# Patient Record
Sex: FEMALE
[De-identification: names, ages, dates, MRNs, and addresses within clinical notes are randomized per-mention and may not be internally consistent; named-entity substitution may affect disease eponyms.]

---

## 2018-02-01 ENCOUNTER — HOSPITAL ENCOUNTER (EMERGENCY)
Dept: HOSPITAL 46 - ED | Age: 23
Discharge: HOME | End: 2018-02-01
Payer: COMMERCIAL

## 2018-02-01 VITALS — HEIGHT: 65 IN | WEIGHT: 150 LBS | BODY MASS INDEX: 24.99 KG/M2

## 2018-02-01 DIAGNOSIS — F32.9: ICD-10-CM

## 2018-02-01 DIAGNOSIS — F43.10: ICD-10-CM
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
## 2018-02-01 DIAGNOSIS — J45.909: ICD-10-CM

## 2018-02-01 DIAGNOSIS — Z79.899: ICD-10-CM

## 2018-02-01 DIAGNOSIS — F31.9: ICD-10-CM

## 2018-02-01 DIAGNOSIS — S20.212A: Primary | ICD-10-CM

## 2018-02-01 DIAGNOSIS — F41.9: ICD-10-CM

## 2018-02-01 NOTE — XMS
[facility]
  Created on: 2017
 
 Jenise Song
 External Reference #: [id number]
 : [birth year]
 Sex: Female
 
 Demographics
 
 
+-----------------------+---------------------------+
| Address               | [address]             |
|                       | [address]                    |
|                       | [place]  [address] |
+-----------------------+---------------------------+
| Preferred Language    | Unknown                   |
+-----------------------+---------------------------+
| Marital Status        | Unknown                   |
+-----------------------+---------------------------+
| Samaritan Affiliation | Unknown                   |
+-----------------------+---------------------------+
| Race                  | Unknown                   |
+-----------------------+---------------------------+
| Ethnic Group          | Unknown                   |
+-----------------------+---------------------------+
 
 
 Author
 
 
+--------------+----------------------+
| Author       | [facility]    |
+--------------+----------------------+
| Organization | [facility]    |
+--------------+----------------------+
| Address      | [address] |
|              | [place]  [address] |
+--------------+----------------------+
| Phone        | [phone]        |
+--------------+----------------------+
 
 
 
 Care Team Providers
 
 
+-----------------------+-------------+-------------+
| Care Team Member Name | Role        | Phone       |
+-----------------------+-------------+-------------+
 Unavailable | Unavailable |
+-----------------------+-------------+-------------+
 
 
 
 PROBLEMS
 
 
 
+---------+------------+----------+-----------+--------+------------+-----------+
| Type    | Condition  | ICD9-CM  | [id number]  | Onset  | Condition  | SNOMED    |
|         |            | Code     | Code      | Dates  | Status     | Code      |
+---------+------------+----------+-----------+--------+------------+-----------+
| Problem | Bipolar    | F31.9    |           |        | Active     | [id number]  |
|         | disorder   |          |           |        |            |           |
+---------+------------+----------+-----------+--------+------------+-----------+
| Problem | Anxiety    | F41.8    |           |        | Active     | [id number] |
|         | with       |          |           |        |            |           |
|         | depression |          |           |        |            |           |
+---------+------------+----------+-----------+--------+------------+-----------+
| Problem | Left lower | R10.32   |           |        | Active     | [id number] |
|         |  quadrant  |          |           |        |            |           |
|         | abdominal  |          |           |        |            |           |
|         | pain of    |          |           |        |            |           |
|         | unknown    |          |           |        |            |           |
|         | etiology   |          |           |        |            |           |
+---------+------------+----------+-----------+--------+------------+-----------+
| Problem | PTSD       |          | F43.10    |        | Active     | [id number]  |
|         | (post-trau |          |           |        |            |           |
|         | matic      |          |           |        |            |           |
|         | stress     |          |           |        |            |           |
|         | disorder)  |          |           |        |            |           |
+---------+------------+----------+-----------+--------+------------+-----------+
| Problem | Epigastric |          | R10.13    |        | Active     | [id number]  |
|         |  pain      |          |           |        |            |           |
+---------+------------+----------+-----------+--------+------------+-----------+
 
 
 
 ALLERGIES
 Unknown Allergies
 
 SOCIAL HISTORY
 No smoking Hx information available
 
 PLAN OF CARE
 
 
 VITAL SIGNS
 
 
 MEDICATIONS
 Unknown Medications
 
 RESULTS
 No Results
 
 PROCEDURES
 No Known procedures
 
 IMMUNIZATIONS
 No Known Immunizations

## 2018-02-01 NOTE — XMS
[facility]
  Created on: 2017
 
 Jenise Song
 External Reference #: [id number]
 : [birth year]
 Sex: Female
 
 Demographics
 
 
+-----------------------+---------------------------+
| Address               | [address]             |
|                       | [address]                    |
|                       | [place]  [address] |
+-----------------------+---------------------------+
| Preferred Language    | Unknown                   |
+-----------------------+---------------------------+
| Marital Status        | Unknown                   |
+-----------------------+---------------------------+
| Judaism Affiliation | Unknown                   |
+-----------------------+---------------------------+
| Race                  | Unknown                   |
+-----------------------+---------------------------+
| Ethnic Group          | Unknown                   |
+-----------------------+---------------------------+
 
 
 Author
 
 
+--------------+----------------------+
| Author       | [facility]    |
+--------------+----------------------+
| Organization | [facility]    |
+--------------+----------------------+
| Address      | [address] |
|              | [place]  [address] |
+--------------+----------------------+
| Phone        | [phone]        |
+--------------+----------------------+
 
 
 
 Care Team Providers
 
 
+-----------------------+-------------+-------------+
| Care Team Member Name | Role        | Phone       |
+-----------------------+-------------+-------------+
 Unavailable | Unavailable |
+-----------------------+-------------+-------------+
 
 
 
 PROBLEMS
 
 
 
+---------+------------+----------+-----------+--------+------------+-----------+
| Type    | Condition  | ICD9-CM  | [id number]  | Onset  | Condition  | SNOMED    |
|         |            | Code     | Code      | Dates  | Status     | Code      |
+---------+------------+----------+-----------+--------+------------+-----------+
| Problem | Bipolar    | F31.9    |           |        | Active     | [id number]  |
|         | disorder   |          |           |        |            |           |
+---------+------------+----------+-----------+--------+------------+-----------+
| Problem | Anxiety    | F41.8    |           |        | Active     | [id number] |
|         | with       |          |           |        |            |           |
|         | depression |          |           |        |            |           |
+---------+------------+----------+-----------+--------+------------+-----------+
| Problem | Left lower | R10.32   |           |        | Active     | [id number] |
|         |  quadrant  |          |           |        |            |           |
|         | abdominal  |          |           |        |            |           |
|         | pain of    |          |           |        |            |           |
|         | unknown    |          |           |        |            |           |
|         | etiology   |          |           |        |            |           |
+---------+------------+----------+-----------+--------+------------+-----------+
| Problem | PTSD       |          | F43.10    |        | Active     | [id number]  |
|         | (post-trau |          |           |        |            |           |
|         | matic      |          |           |        |            |           |
|         | stress     |          |           |        |            |           |
|         | disorder)  |          |           |        |            |           |
+---------+------------+----------+-----------+--------+------------+-----------+
| Problem | Epigastric |          | R10.13    |        | Active     | [id number]  |
|         |  pain      |          |           |        |            |           |
+---------+------------+----------+-----------+--------+------------+-----------+
 
 
 
 ALLERGIES
 Unknown Allergies
 
 SOCIAL HISTORY
 No smoking Hx information available
 
 PLAN OF CARE
 
 
 VITAL SIGNS
 
 
 MEDICATIONS
 Unknown Medications
 
 RESULTS
 No Results
 
 PROCEDURES
 No Known procedures
 
 IMMUNIZATIONS
 No Known Immunizations

## 2018-02-01 NOTE — XMS
[facility]
  Created on: 2017
 
 Jenise Song
 External Reference #: [id number]
 : [birth year]
 Sex: Female
 
 Demographics
 
 
+-----------------------+---------------------------+
| Address               | [address]             |
|                       | [address]                    |
|                       | [place]  [address] |
+-----------------------+---------------------------+
| Preferred Language    | Unknown                   |
+-----------------------+---------------------------+
| Marital Status        | Unknown                   |
+-----------------------+---------------------------+
| Sikhism Affiliation | Unknown                   |
+-----------------------+---------------------------+
| Race                  | Unknown                   |
+-----------------------+---------------------------+
| Ethnic Group          | Unknown                   |
+-----------------------+---------------------------+
 
 
 Author
 
 
+--------------+----------------------+
| Author       | [facility]    |
+--------------+----------------------+
| Organization | [facility]    |
+--------------+----------------------+
| Address      | [address] |
|              | [place]  [address] |
+--------------+----------------------+
| Phone        | [phone]        |
+--------------+----------------------+
 
 
 
 Care Team Providers
 
 
+-----------------------+-------------+-------------+
| Care Team Member Name | Role        | Phone       |
+-----------------------+-------------+-------------+
 Unavailable | Unavailable |
+-----------------------+-------------+-------------+
 
 
 
 PROBLEMS
 
 
 
+---------+------------+----------+-----------+--------+------------+-----------+
| Type    | Condition  | ICD9-CM  | [id number]  | Onset  | Condition  | SNOMED    |
|         |            | Code     | Code      | Dates  | Status     | Code      |
+---------+------------+----------+-----------+--------+------------+-----------+
| Problem | Bipolar    | F31.9    |           |        | Active     | [id number]  |
|         | disorder   |          |           |        |            |           |
+---------+------------+----------+-----------+--------+------------+-----------+
| Problem | Anxiety    | F41.8    |           |        | Active     | [id number] |
|         | with       |          |           |        |            |           |
|         | depression |          |           |        |            |           |
+---------+------------+----------+-----------+--------+------------+-----------+
| Problem | Left lower | R10.32   |           |        | Active     | [id number] |
|         |  quadrant  |          |           |        |            |           |
|         | abdominal  |          |           |        |            |           |
|         | pain of    |          |           |        |            |           |
|         | unknown    |          |           |        |            |           |
|         | etiology   |          |           |        |            |           |
+---------+------------+----------+-----------+--------+------------+-----------+
| Problem | PTSD       |          | F43.10    |        | Active     | [id number]  |
|         | (post-trau |          |           |        |            |           |
|         | matic      |          |           |        |            |           |
|         | stress     |          |           |        |            |           |
|         | disorder)  |          |           |        |            |           |
+---------+------------+----------+-----------+--------+------------+-----------+
| Problem | Epigastric |          | R10.13    |        | Active     | [id number]  |
|         |  pain      |          |           |        |            |           |
+---------+------------+----------+-----------+--------+------------+-----------+
 
 
 
 ALLERGIES
 Unknown Allergies
 
 SOCIAL HISTORY
 No smoking Hx information available
 
 PLAN OF CARE
 
 
 VITAL SIGNS
 
 
 MEDICATIONS
 Unknown Medications
 
 RESULTS
 No Results
 
 PROCEDURES
 No Known procedures
 
 IMMUNIZATIONS
 No Known Immunizations

## 2018-02-01 NOTE — XMS
[facility]
  Created on: 2017
 
 Jenise Song
 External Reference #: [id number]
 : [birth year]
 Sex: Female
 
 Demographics
 
 
+-----------------------+---------------------------+
| Address               | [address]             |
|                       | [address]                    |
|                       | [place]  [address] |
+-----------------------+---------------------------+
| Preferred Language    | Unknown                   |
+-----------------------+---------------------------+
| Marital Status        | Unknown                   |
+-----------------------+---------------------------+
| Yarsanism Affiliation | Unknown                   |
+-----------------------+---------------------------+
| Race                  | Unknown                   |
+-----------------------+---------------------------+
| Ethnic Group          | Unknown                   |
+-----------------------+---------------------------+
 
 
 Author
 
 
+--------------+----------------------+
| Author       | [facility]    |
+--------------+----------------------+
| Organization | [facility]    |
+--------------+----------------------+
| Address      | [address] |
|              | [place]  [address] |
+--------------+----------------------+
| Phone        | [phone]        |
+--------------+----------------------+
 
 
 
 Care Team Providers
 
 
+-----------------------+-------------+-------------+
| Care Team Member Name | Role        | Phone       |
+-----------------------+-------------+-------------+
 Unavailable | Unavailable |
+-----------------------+-------------+-------------+
 
 
 
 PROBLEMS
 
 
 
+---------+------------+----------+-----------+--------+------------+-----------+
| Type    | Condition  | ICD9-CM  | [id number]  | Onset  | Condition  | SNOMED    |
|         |            | Code     | Code      | Dates  | Status     | Code      |
+---------+------------+----------+-----------+--------+------------+-----------+
| Problem | Bipolar    | F31.9    |           |        | Active     | [id number]  |
|         | disorder   |          |           |        |            |           |
+---------+------------+----------+-----------+--------+------------+-----------+
| Problem | Anxiety    | F41.8    |           |        | Active     | [id number] |
|         | with       |          |           |        |            |           |
|         | depression |          |           |        |            |           |
+---------+------------+----------+-----------+--------+------------+-----------+
| Problem | Left lower | R10.32   |           |        | Active     | [id number] |
|         |  quadrant  |          |           |        |            |           |
|         | abdominal  |          |           |        |            |           |
|         | pain of    |          |           |        |            |           |
|         | unknown    |          |           |        |            |           |
|         | etiology   |          |           |        |            |           |
+---------+------------+----------+-----------+--------+------------+-----------+
| Problem | PTSD       |          | F43.10    |        | Active     | [id number]  |
|         | (post-trau |          |           |        |            |           |
|         | matic      |          |           |        |            |           |
|         | stress     |          |           |        |            |           |
|         | disorder)  |          |           |        |            |           |
+---------+------------+----------+-----------+--------+------------+-----------+
| Problem | Epigastric |          | R10.13    |        | Active     | [id number]  |
|         |  pain      |          |           |        |            |           |
+---------+------------+----------+-----------+--------+------------+-----------+
 
 
 
 ALLERGIES
 Unknown Allergies
 
 SOCIAL HISTORY
 No smoking Hx information available
 
 PLAN OF CARE
 
 
 VITAL SIGNS
 
 
 MEDICATIONS
 
 
+----------+----------+----------+----------+----------+----------+----------+--------+
| Medicati | Instruct | Dosage   | Frequenc | Start    | End Date | Duration | Status |
| on       | ions     |          | y        | Date     |          |          |        |
+----------+----------+----------+----------+----------+----------+----------+--------+
| Metronid | Orally   | 1 tablet | 8h       | 31 Jul,  | 10 Aug,  | 14       | Active |
| azole    | every 8  |          |          | 2017     | 2017     | day(s)   |        |
| 500 MG   | hrs      |          |          |          |          |          |        |
+----------+----------+----------+----------+----------+----------+----------+--------+
 
 
 
 RESULTS
 No Results
 
 PROCEDURES
 
 No Known procedures
 
 IMMUNIZATIONS
 No Known Immunizations

## 2018-02-01 NOTE — XMS
[facility]
  Created on: 2017
 
 Jenise Song
 External Reference #: [id number]
 : [birth year]
 Sex: Female
 
 Demographics
 
 
+-----------------------+---------------------------+
| Address               | [address]             |
|                       | [address]                    |
|                       | [place]  [address] |
+-----------------------+---------------------------+
| Preferred Language    | Unknown                   |
+-----------------------+---------------------------+
| Marital Status        | Unknown                   |
+-----------------------+---------------------------+
| Yazdanism Affiliation | Unknown                   |
+-----------------------+---------------------------+
| Race                  | Unknown                   |
+-----------------------+---------------------------+
| Ethnic Group          | Unknown                   |
+-----------------------+---------------------------+
 
 
 Author
 
 
+--------------+----------------------+
| Author       | [facility]    |
+--------------+----------------------+
| Organization | [facility]    |
+--------------+----------------------+
| Address      | [address] |
|              | [place]  [address] |
+--------------+----------------------+
| Phone        | [phone]        |
+--------------+----------------------+
 
 
 
 Care Team Providers
 
 
+-----------------------+-------------+-------------+
| Care Team Member Name | Role        | Phone       |
+-----------------------+-------------+-------------+
 Unavailable | Unavailable |
+-----------------------+-------------+-------------+
 
 
 
 PROBLEMS
 
 
 
+---------+------------+----------+-----------+--------+------------+-----------+
| Type    | Condition  | ICD9-CM  | VAL16-NO  | Onset  | Condition  | SNOMED    |
|         |            | Code     | Code      | Dates  | Status     | Code      |
+---------+------------+----------+-----------+--------+------------+-----------+
| Problem | Bipolar    | F31.9    |           |        | Active     | [id number]  |
|         | disorder   |          |           |        |            |           |
+---------+------------+----------+-----------+--------+------------+-----------+
| Problem | Anxiety    | F41.8    |           |        | Active     | [id number] |
|         | with       |          |           |        |            |           |
|         | depression |          |           |        |            |           |
+---------+------------+----------+-----------+--------+------------+-----------+
| Problem | Left lower | R10.32   |           |        | Active     | [id number] |
|         |  quadrant  |          |           |        |            |           |
|         | abdominal  |          |           |        |            |           |
|         | pain of    |          |           |        |            |           |
|         | unknown    |          |           |        |            |           |
|         | etiology   |          |           |        |            |           |
+---------+------------+----------+-----------+--------+------------+-----------+
| Problem | PTSD       |          | F43.10    |        | Active     | [id number]  |
|         | (post-trau |          |           |        |            |           |
|         | matic      |          |           |        |            |           |
|         | stress     |          |           |        |            |           |
|         | disorder)  |          |           |        |            |           |
+---------+------------+----------+-----------+--------+------------+-----------+
| Problem | Epigastric |          | R10.13    |        | Active     | [id number]  |
|         |  pain      |          |           |        |            |           |
+---------+------------+----------+-----------+--------+------------+-----------+
 
 
 
 ALLERGIES
 
 
+-----------+----------+------------+--------------+--------+
| Substance | Reaction | Event Type | Date         | Status |
+-----------+----------+------------+--------------+--------+
| Seasonal  | Unknown  | Non Drug   | 06 Sep, 2017 | Active |
|           |          | Allergy    |              |        |
+-----------+----------+------------+--------------+--------+
 
 
 
 SOCIAL HISTORY
 No smoking Hx information available
 
 PLAN OF CARE
 
 
+----------+---------+
| Activity | Details |
+----------+---------+
 
 
 
+---+
|   |
+---+
 
 
 
 
+-----------+-----------------+
| Follow Up | prn Reason:null |
+-----------+-----------------+
 
 
 
 VITAL SIGNS
 
 
+--------------------------+-------------------------+------------+
| Height                   | 65.5 in                 | 2017 |
+--------------------------+-------------------------+------------+
| Weight                   | 149 lbs                 | 2017 |
+--------------------------+-------------------------+------------+
| BMI                      | 24.42 kg/m2             | 2017 |
+--------------------------+-------------------------+------------+
| Temperature              | 99.1 degrees Fahrenheit | 2017 |
+--------------------------+-------------------------+------------+
| Heart Rate               | 113 /min                | 2017 |
+--------------------------+-------------------------+------------+
| Blood pressure systolic  | 120 mm Hg               | 2017 |
+--------------------------+-------------------------+------------+
| Blood pressure diastolic | 75 mm Hg                | 2017 |
+--------------------------+-------------------------+------------+
 
 
 
 MEDICATIONS
 
 
+----------+----------+--------+----------+--------+----------+----------+--------+
| Medicati | Instruct | Dosage | Frequenc | Start  | End Date | Duration | Status |
| on       | ions     |        | y        | Date   |          |          |        |
+----------+----------+--------+----------+--------+----------+----------+--------+
| Nexplano |          |        |          |        |          |          | Active |
| n        |          |        |          |        |          |          |        |
+----------+----------+--------+----------+--------+----------+----------+--------+
 
 
 
 RESULTS
 No Results
 
 PROCEDURES
 
 
+---------------------+---------------+-------------------+-----------+
| Procedure           | Date Ordered  | Related Diagnosis | Body Site |
+---------------------+---------------+-------------------+-----------+
| Est Level IV        | 2017 |                   |           |
| Extended            |               |                   |           |
+---------------------+---------------+-------------------+-----------+
| [facility] MED/CURRENT  | 2017 |                   |           |
| MED MERGE           |               |                   |           |
+---------------------+---------------+-------------------+-----------+
| DOC MEDS VERIFIED   | 2017 |                   |           |
| W/PT OR RE          |               |                   |           |
+---------------------+---------------+-------------------+-----------+
 
 
 
 
 IMMUNIZATIONS
 No Known Immunizations

## 2018-02-01 NOTE — XMS
[facility]
  Created on: 2017
 
 Jenise Song
 External Reference #: [id number]
 : 89
 Sex: Female
 
 Demographics
 
 
+-----------------------+---------------------------+
| Address               | [address]             |
|                       | [address]                    |
|                       | [place]  [address] |
+-----------------------+---------------------------+
| Preferred Language    | Unknown                   |
+-----------------------+---------------------------+
| Marital Status        | Unknown                   |
+-----------------------+---------------------------+
| Yazdanism Affiliation | Unknown                   |
+-----------------------+---------------------------+
| Race                  | Unknown                   |
+-----------------------+---------------------------+
| Ethnic Group          | Unknown                   |
+-----------------------+---------------------------+
 
 
 Author
 
 
+--------------+----------------------+
| Author       | [facility]    |
+--------------+----------------------+
| Organization | [facility]    |
+--------------+----------------------+
| Address      | [address] |
|              | [place]  [address] |
+--------------+----------------------+
| Phone        | [phone]        |
+--------------+----------------------+
 
 
 
 Care Team Providers
 
 
+-----------------------+-------------+-------------+
| Care Team Member Name | Role        | Phone       |
+-----------------------+-------------+-------------+
 Unavailable | Unavailable |
+-----------------------+-------------+-------------+
 
 
 
 PROBLEMS
 
 
 
+---------+------------+----------+-----------+--------+------------+-----------+
| Type    | Condition  | ICD9-CM  | KRL05-QA  | Onset  | Condition  | SNOMED    |
|         |            | Code     | Code      | Dates  | Status     | Code      |
+---------+------------+----------+-----------+--------+------------+-----------+
| Problem | Bipolar    | F31.9    |           |        | Active     | [id number]  |
|         | disorder   |          |           |        |            |           |
+---------+------------+----------+-----------+--------+------------+-----------+
| Problem | Anxiety    | F41.8    |           |        | Active     | [id number] |
|         | with       |          |           |        |            |           |
|         | depression |          |           |        |            |           |
+---------+------------+----------+-----------+--------+------------+-----------+
| Problem | Left lower | R10.32   |           |        | Active     | [id number] |
|         |  quadrant  |          |           |        |            |           |
|         | abdominal  |          |           |        |            |           |
|         | pain of    |          |           |        |            |           |
|         | unknown    |          |           |        |            |           |
|         | etiology   |          |           |        |            |           |
+---------+------------+----------+-----------+--------+------------+-----------+
| Problem | PTSD       |          | F43.10    |        | Active     | [id number]  |
|         | (post-trau |          |           |        |            |           |
|         | matic      |          |           |        |            |           |
|         | stress     |          |           |        |            |           |
|         | disorder)  |          |           |        |            |           |
+---------+------------+----------+-----------+--------+------------+-----------+
| Problem | Epigastric |          | R10.13    |        | Active     | [id number]  |
|         |  pain      |          |           |        |            |           |
+---------+------------+----------+-----------+--------+------------+-----------+
 
 
 
 ALLERGIES
 
 
+-----------+----------+------------+--------------+--------+
| Substance | Reaction | Event Type | Date         | Status |
+-----------+----------+------------+--------------+--------+
| Seasonal  | Unknown  | Non Drug   |  | Active |
|           |          | Allergy    |              |        |
+-----------+----------+------------+--------------+--------+
 
 
 
 SOCIAL HISTORY
 No smoking Hx information available
 
 PLAN OF CARE
 
 
+----------+---------+
| Activity | Details |
+----------+---------+
 
 
 
+---+
|   |
+---+
 
 
 
 
+--------------+-------------------------------------+
| Follow Up    | 2 Weeks Reason:null                 |
+--------------+-------------------------------------+
| Pending Test | Urinalysis, Complete                |
+--------------+-------------------------------------+
| Pending Test | CBC With Differential/Platelet      |
+--------------+-------------------------------------+
| Pending Test | Stool Culture                       |
+--------------+-------------------------------------+
| Pending Test | TSH+Free T4                         |
+--------------+-------------------------------------+
| Pending Test | Comp. Metabolic Panel (14)          |
+--------------+-------------------------------------+
| Pending Test | Stool-C Difficile Toxin             |
+--------------+-------------------------------------+
| Pending Test | Stool Culture -O&P                  |
+--------------+-------------------------------------+
| Pending Test | HCG, Qual                           |
+--------------+-------------------------------------+
| Pending Test | Helicobacter pylori Antigen, Stool  |
+--------------+-------------------------------------+
          
 
 VITAL SIGNS
 
 
+--------------------------+-------------------------+------------+
| Height                   | 65.5 in                 | 2017 |
+--------------------------+-------------------------+------------+
| Weight                   | 147.8 lbs               | 2017 |
+--------------------------+-------------------------+------------+
| BMI                      | 24.22 kg/m2             | 2017 |
+--------------------------+-------------------------+------------+
| Temperature              | 98.5 degrees Fahrenheit | 2017 |
+--------------------------+-------------------------+------------+
| Heart Rate               | 88 /min                 | 2017 |
+--------------------------+-------------------------+------------+
| Blood pressure systolic  | 135 mm Hg               | 2017 |
+--------------------------+-------------------------+------------+
| Blood pressure diastolic | 90 mm Hg                | 2017 |
+--------------------------+-------------------------+------------+
 
 
 
 MEDICATIONS
 
 
+----------+----------+---------+----------+----------+----------+----------+--------+
| Medicati | Instruct | Dosage  | Frequenc | Start    | End Date | Duration | Status |
| on       | ions     |         | y        | Date     |          |          |        |
+----------+----------+---------+----------+----------+----------+----------+--------+
| Omeprazo | Orally   | 1       |          | 26 Jul,  |          | 30       | Active |
| le 20 mg | qHS      | capsule |          |      |          | day(s)   |        |
+----------+----------+---------+----------+----------+----------+----------+--------+
| Nexplano |          |         |          |          |          |          | Active |
| n        |          |         |          |          |          |          |        |
+----------+----------+---------+----------+----------+----------+----------+--------+
 
 
 
 
 RESULTS
 No Results
 
 PROCEDURES
 
 
+---------------------+---------------+-------------------+-----------+
| Procedure           | Date Ordered  | Related Diagnosis | Body Site |
+---------------------+---------------+-------------------+-----------+
| Est Level IV        | 2017 |                   |           |
| Extended            |               |                   |           |
+---------------------+---------------+-------------------+-----------+
| [facility] MED/CURRENT  | 2017 |                   |           |
| MED MERGE           |               |                   |           |
+---------------------+---------------+-------------------+-----------+
| DOC MEDS VERIFIED   | 2017 |                   |           |
| W/PT OR RE          |               |                   |           |
+---------------------+---------------+-------------------+-----------+
 
 
 
 IMMUNIZATIONS
 No Known Immunizations

## 2018-04-26 ENCOUNTER — HOSPITAL ENCOUNTER (EMERGENCY)
Dept: HOSPITAL 46 - ED | Age: 23
Discharge: HOME | End: 2018-04-26
Payer: COMMERCIAL

## 2018-04-26 VITALS — WEIGHT: 150 LBS | HEIGHT: 65 IN | BODY MASS INDEX: 24.99 KG/M2

## 2018-04-26 DIAGNOSIS — Z79.899: ICD-10-CM

## 2018-04-26 DIAGNOSIS — F32.9: ICD-10-CM

## 2018-04-26 DIAGNOSIS — F43.10: ICD-10-CM

## 2018-04-26 DIAGNOSIS — J45.909: ICD-10-CM

## 2018-04-26 DIAGNOSIS — Z00.8: Primary | ICD-10-CM

## 2018-04-26 DIAGNOSIS — F31.9: ICD-10-CM

## 2018-04-26 DIAGNOSIS — F41.9: ICD-10-CM
